# Patient Record
Sex: FEMALE | Race: WHITE | URBAN - METROPOLITAN AREA
[De-identification: names, ages, dates, MRNs, and addresses within clinical notes are randomized per-mention and may not be internally consistent; named-entity substitution may affect disease eponyms.]

---

## 2020-08-11 ENCOUNTER — OFFICE VISIT (OUTPATIENT)
Dept: URBAN - METROPOLITAN AREA CLINIC 94 | Facility: CLINIC | Age: 50
End: 2020-08-11

## 2022-06-09 ENCOUNTER — WEB ENCOUNTER (OUTPATIENT)
Dept: URBAN - METROPOLITAN AREA CLINIC 98 | Facility: CLINIC | Age: 52
End: 2022-06-09

## 2022-06-09 ENCOUNTER — OFFICE VISIT (OUTPATIENT)
Dept: URBAN - METROPOLITAN AREA CLINIC 98 | Facility: CLINIC | Age: 52
End: 2022-06-09
Payer: COMMERCIAL

## 2022-06-09 VITALS
BODY MASS INDEX: 31.86 KG/M2 | TEMPERATURE: 97.9 F | SYSTOLIC BLOOD PRESSURE: 125 MMHG | DIASTOLIC BLOOD PRESSURE: 86 MMHG | HEART RATE: 84 BPM | WEIGHT: 179.8 LBS | HEIGHT: 63 IN

## 2022-06-09 DIAGNOSIS — K59.09 CHRONIC CONSTIPATION: ICD-10-CM

## 2022-06-09 DIAGNOSIS — E66.9 OBESITY (BMI 30.0-34.9): ICD-10-CM

## 2022-06-09 PROCEDURE — 99203 OFFICE O/P NEW LOW 30 MIN: CPT | Performed by: INTERNAL MEDICINE

## 2022-06-09 PROCEDURE — 99243 OFF/OP CNSLTJ NEW/EST LOW 30: CPT | Performed by: INTERNAL MEDICINE

## 2022-06-09 RX ORDER — RADIOPAQUE PVC MARKERS/BARIUM 24MARKERS
AS DIRECTED CAPSULE ORAL
Qty: 1 CAPSULE | Refills: 0 | OUTPATIENT
Start: 2022-06-09 | End: 2022-06-10

## 2022-06-09 RX ORDER — METRONIDAZOLE 500 MG/1
1 TABLET TABLET ORAL THREE TIMES A DAY
Status: ACTIVE | COMMUNITY

## 2022-06-10 ENCOUNTER — TELEPHONE ENCOUNTER (OUTPATIENT)
Dept: URBAN - METROPOLITAN AREA CLINIC 98 | Facility: CLINIC | Age: 52
End: 2022-06-10

## 2022-06-30 ENCOUNTER — LAB OUTSIDE AN ENCOUNTER (OUTPATIENT)
Dept: URBAN - METROPOLITAN AREA CLINIC 98 | Facility: CLINIC | Age: 52
End: 2022-06-30

## 2022-07-02 ENCOUNTER — OFFICE VISIT (OUTPATIENT)
Dept: URBAN - METROPOLITAN AREA TELEHEALTH 2 | Facility: TELEHEALTH | Age: 52
End: 2022-07-02
Payer: COMMERCIAL

## 2022-07-02 DIAGNOSIS — K59.09 CHRONIC CONSTIPATION: ICD-10-CM

## 2022-07-02 DIAGNOSIS — N80.0 UTERUS, ADENOMYOSIS: ICD-10-CM

## 2022-07-02 DIAGNOSIS — E66.8 OTHER OBESITY: ICD-10-CM

## 2022-07-02 DIAGNOSIS — M62.89 PELVIC FLOOR DYSFUNCTION IN FEMALE: ICD-10-CM

## 2022-07-02 PROCEDURE — 99214 OFFICE O/P EST MOD 30 MIN: CPT | Performed by: INTERNAL MEDICINE

## 2022-07-02 RX ORDER — PLECANATIDE 3 MG/1
1 TABLET TABLET ORAL ONCE A DAY
Qty: 30 | Refills: 3 | OUTPATIENT
Start: 2022-07-03 | End: 2022-10-31

## 2022-07-02 RX ORDER — METRONIDAZOLE 500 MG/1
1 TABLET TABLET ORAL THREE TIMES A DAY
Status: ACTIVE | COMMUNITY

## 2022-07-02 NOTE — HPI-TODAY'S VISIT:
This is a Telehealth OV to which patient has agreed to. Limitations of telehealth discussed; she understands and agrees to proceed. Patient's @ home during this virtual OV.  52 yo pt w chronic constipation x 4 to 5 yrs,w ++ straining @ stools, on probiotics / MagCitrate prn / Miralax / fiber suppl / IBgard with which he has liquid stools w small fecal material.  Still w above  and intermittent abdominal bloating, and L-sided abdominal discomfort. Has been on Linzess 145 / 290: watery and dark stools. He was recently Rx'd w Flagyl for bronchitis and his constipation improved while being on abx's.  Sitz Calderon: single marker at the splenic flexure and moderate stool burden. Pelvic MRI: small to moderate cystocele, large anterior rectocele, mild pelvic floor descent @ rest and severe pelvic floor descent w Valsalva, abnormal descent of bladder and uterine ademomyosis.  Normal colonoscopy and EGD 2 yrs ago, no copy available. Normal labs 2 weeks ago w Dr. Barrera. No anorexia. Has gained weight lately. No cardiorespiratory sxs.  No COVID-1 exposure and has received 2 doses of COVID-19 vaccine. No other complaints.

## 2022-07-26 ENCOUNTER — TELEPHONE ENCOUNTER (OUTPATIENT)
Dept: URBAN - METROPOLITAN AREA CLINIC 98 | Facility: CLINIC | Age: 52
End: 2022-07-26

## 2022-09-22 ENCOUNTER — OFFICE VISIT (OUTPATIENT)
Dept: URBAN - METROPOLITAN AREA CLINIC 98 | Facility: CLINIC | Age: 52
End: 2022-09-22
Payer: COMMERCIAL

## 2022-09-22 DIAGNOSIS — K59.09 CHRONIC CONSTIPATION: ICD-10-CM

## 2022-09-22 DIAGNOSIS — N80.0 UTERUS, ADENOMYOSIS: ICD-10-CM

## 2022-09-22 DIAGNOSIS — M62.89 PELVIC FLOOR DYSFUNCTION IN FEMALE: ICD-10-CM

## 2022-09-22 DIAGNOSIS — N93.9 VAGINAL BLEEDING: ICD-10-CM

## 2022-09-22 DIAGNOSIS — E66.9 OBESITY (BMI 30.0-34.9): ICD-10-CM

## 2022-09-22 PROCEDURE — 99213 OFFICE O/P EST LOW 20 MIN: CPT | Performed by: INTERNAL MEDICINE

## 2022-09-22 RX ORDER — METRONIDAZOLE 500 MG/1
1 TABLET TABLET ORAL THREE TIMES A DAY
Status: ACTIVE | COMMUNITY

## 2022-09-22 RX ORDER — PLECANATIDE 3 MG/1
1 TABLET TABLET ORAL ONCE A DAY
Qty: 30 | Refills: 3 | Status: ACTIVE | COMMUNITY
Start: 2022-07-03 | End: 2022-10-31

## 2022-09-22 NOTE — HPI-TODAY'S VISIT:
This is a Telephone  OV to which patient has agreed to. Limitations of telehealth discussed; she understands and agrees to proceed. Patient's @ home during this virtual OV.  52 yo pt w chronic constipation w ++ straining @ stools, on probiotics / MagCitrate prn / Miralax / fiber suppl / IBgard with which she has  had iquid stools w small fecal material.  Today, she c/o L-sided abdominal pain, w abdominal bloating. She was seen in the ER last week and treated w abx's for presumed diverticulitis. Felt better for a few days. Pain recurred, along w greenish, mushy, non-bloody stools bid. Pain unchanged after bm's. Has been having vaginal bleeding daily  for past few days. No fever or chills. She has been Rx'd w Cipro, currently on day 6 of a 7 days course. She had a Pelvic US, report unavailable as yet.   Sitz Calderon: single marker at the splenic flexure and moderate stool burden. Pelvic MRI: small to moderate cystocele, large anterior rectocele, mild pelvic floor descent @ rest and severe pelvic floor descent w Valsalva, abnormal descent of bladder and uterine ademomyosis.  Normal colonoscopy and EGD 2 yrs ago, no copy available. Normal labs 2 weeks ago w Dr. Barrera. No anorexia. Has gained weight lately. No cardiorespiratory sxs.  No COVID-1 exposure and has received 2 doses of COVID-19 vaccine. No other complaints.

## 2022-09-24 ENCOUNTER — DASHBOARD ENCOUNTERS (OUTPATIENT)
Age: 52
End: 2022-09-24

## 2022-09-24 PROBLEM — 289530006: Status: ACTIVE | Noted: 2022-09-24

## 2022-09-24 PROBLEM — 443371000124107: Status: ACTIVE | Noted: 2022-06-16

## 2022-11-03 ENCOUNTER — TELEPHONE ENCOUNTER (OUTPATIENT)
Dept: URBAN - METROPOLITAN AREA CLINIC 6 | Facility: CLINIC | Age: 52
End: 2022-11-03

## 2022-11-07 ENCOUNTER — OFFICE VISIT (OUTPATIENT)
Dept: URBAN - METROPOLITAN AREA CLINIC 94 | Facility: CLINIC | Age: 52
End: 2022-11-07